# Patient Record
Sex: MALE | Race: BLACK OR AFRICAN AMERICAN | Employment: FULL TIME | URBAN - METROPOLITAN AREA
[De-identification: names, ages, dates, MRNs, and addresses within clinical notes are randomized per-mention and may not be internally consistent; named-entity substitution may affect disease eponyms.]

---

## 2024-04-11 DIAGNOSIS — S61.532D GUNSHOT WOUND OF LEFT WRIST, SUBSEQUENT ENCOUNTER: ICD-10-CM

## 2024-04-11 DIAGNOSIS — Z11.4 SCREENING FOR HIV (HUMAN IMMUNODEFICIENCY VIRUS): ICD-10-CM

## 2024-04-11 DIAGNOSIS — I10 ESSENTIAL HYPERTENSION: ICD-10-CM

## 2024-04-11 DIAGNOSIS — Z13.220 SCREENING FOR LIPID DISORDERS: ICD-10-CM

## 2024-04-11 DIAGNOSIS — Z11.59 NEED FOR HEPATITIS C SCREENING TEST: ICD-10-CM

## 2024-04-11 PROBLEM — S61.532A: Status: ACTIVE | Noted: 2024-04-11

## 2024-04-11 PROBLEM — S61.531A: Status: ACTIVE | Noted: 2024-04-11

## 2024-04-11 PROBLEM — R03.0 ELEVATED BP WITHOUT DIAGNOSIS OF HYPERTENSION: Status: ACTIVE | Noted: 2024-04-11

## 2024-04-12 ENCOUNTER — HOME HEALTH ADMISSION (OUTPATIENT)
Age: 24
End: 2024-04-12
Payer: MEDICAID

## 2024-04-14 ENCOUNTER — HOME CARE VISIT (OUTPATIENT)
Age: 24
End: 2024-04-14
Payer: MEDICAID

## 2024-04-14 PROCEDURE — G0299 HHS/HOSPICE OF RN EA 15 MIN: HCPCS

## 2024-04-15 ENCOUNTER — HOME CARE VISIT (OUTPATIENT)
Age: 24
End: 2024-04-15
Payer: MEDICAID

## 2024-04-15 VITALS
TEMPERATURE: 98.8 F | SYSTOLIC BLOOD PRESSURE: 134 MMHG | RESPIRATION RATE: 20 BRPM | DIASTOLIC BLOOD PRESSURE: 80 MMHG | OXYGEN SATURATION: 98 % | HEART RATE: 92 BPM

## 2024-04-15 VITALS
RESPIRATION RATE: 16 BRPM | SYSTOLIC BLOOD PRESSURE: 140 MMHG | OXYGEN SATURATION: 98 % | HEART RATE: 78 BPM | TEMPERATURE: 98.8 F | DIASTOLIC BLOOD PRESSURE: 80 MMHG

## 2024-04-15 PROCEDURE — G0299 HHS/HOSPICE OF RN EA 15 MIN: HCPCS

## 2024-04-15 ASSESSMENT — ENCOUNTER SYMPTOMS
PAIN LOCATION - PAIN QUALITY: THROBBING
DYSPNEA ACTIVITY LEVEL: AFTER AMBULATING 10 - 20 FT
HEMOPTYSIS: 0
PAIN LOCATION - PAIN QUALITY: SHARP/THROBBING
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
## 2024-04-15 NOTE — HOME HEALTH
Limit Tylenol to 4000 mg /day max.  Eat a diet high in protein to promote healing.     Patient level of understanding of education provided: Patient with good understanding of material presented.   Patient response to procedure performed:  Patient was cooperative and engaged throughout SOC interview.     Home exercise program/Homework provided: Keep dressing Clean dry and intact.  Continue to take all medications as prescribed.  Needs the assistance of one other for safe ambulation. Be observant for s/s of infection.  Take all antibiotics until they are finished./U with PCP and surgeon as instructed.     Functional Mobility:  Bed Mobility (rolling/scooting): Independent  Transfers (supine to chair and return to supine): Supervision or Touching Assistance.  Patient uses device: no  Gait:  Ambulates at the modified independent level using no equipment  Safety concerns with mobility include: pain interferes with activity  DME: no equipment     Pt/Caregiver instructed on plan of care and are agreeable to plan of care at this time.      NP Cat Coon  notified of patient admission to home health and plan of care including anticipated frequency of visits  and treatments/interventions/modalities of SN.    Discharge planning discussed with patient and caregiver.  Discharge planning as follows: Will discharge when education is completed,  patient is medically stable and wound is healed, or family can manage dressing. .  Pt/Caregiver did verbalize understanding of discharge planning.     Next MD appointment TBD with Cat Coon NP.  Patient/caregiver encouraged/instructed to keep appointment as lack of follow through with physician appointment could result in discontinuation of home care services for non-compliance.

## 2024-04-20 ENCOUNTER — HOME CARE VISIT (OUTPATIENT)
Age: 24
End: 2024-04-20
Payer: MEDICAID

## 2024-04-20 VITALS
DIASTOLIC BLOOD PRESSURE: 70 MMHG | RESPIRATION RATE: 16 BRPM | SYSTOLIC BLOOD PRESSURE: 110 MMHG | OXYGEN SATURATION: 98 % | TEMPERATURE: 98 F | HEART RATE: 78 BPM

## 2024-04-20 PROCEDURE — G0300 HHS/HOSPICE OF LPN EA 15 MIN: HCPCS

## 2024-04-20 NOTE — HOME HEALTH
Skilled Needs: Education and Wound Care   Caregiver involvement: Pt independent with care    Medications reviewed and all medications are available in the home this visit.    The following education was provided regarding medications:  TO GARRISON notified of any discrepancies/look a-like medications/medication interactions: TO  Medications are effecrtive at this time.    Home health supplies by type and quantity ordered/delivered this visit include:  Supplies available   Patient education provided this visit:  Reviewed to reported any redness, swelling, warmth, fever, chills, increased heart/respiratory rate, uncontrolled pain/tenderness, drainage:green/yellow/brown, or odor to MD immediately. Wound care completed as ordered. Pt awaiting to hear back from MD about schedulingg surgery   Sharps education provided: TO  Patient level of understanding of education provided: Verbalzied all understanding to above education  Skilled Care Performed this visit: Education and Wound Care  Patient response to procedure performed: Minimal pain during dressing change   Agency Progress toward goals: Progressing toward interventions above  Patient's Progress towards personal goals: when patient reaches goals and medication is managed, and disease processes are understood patient agrees and understand that discharge will take place

## 2024-04-23 ENCOUNTER — HOME CARE VISIT (OUTPATIENT)
Age: 24
End: 2024-04-23
Payer: MEDICAID

## 2024-04-23 VITALS
RESPIRATION RATE: 16 BRPM | OXYGEN SATURATION: 99 % | TEMPERATURE: 97 F | SYSTOLIC BLOOD PRESSURE: 112 MMHG | DIASTOLIC BLOOD PRESSURE: 70 MMHG | HEART RATE: 67 BPM

## 2024-04-23 PROCEDURE — G0300 HHS/HOSPICE OF LPN EA 15 MIN: HCPCS

## 2024-04-23 NOTE — HOME HEALTH
Skilled Needs: Education and Wound Care   Caregiver involvement: Pt independent with care with the exception of wound treatment due to location   Medications reviewed and all medications are available in the home this visit.    The following education was provided regarding medications:  TO GARRISON notified of any discrepancies/look a-like medications/medication interactions: TO  Medications are effecrtive at this time.    Home health supplies by type and quantity ordered/delivered this visit include:  Supplies available   Patient education provided this visit:  Reviewed to reported any redness, swelling, warmth, fever, chills, increased heart/respiratory rate, uncontrolled pain/tenderness, drainage:green/yellow/brown, or odor to MD immediately. Pt due to see surgeon tomorrow and is looking to go back to wor on the 9th of Maay. Nurse educated pt that he is going to have to be discharged once returning to work he verbalzized his mom will help with dressing changes if needed once he goes back to work. Pt also going to call PCP for [employer] to return to work. No pain verbalzied and not taking any pain medications at this time.   Sharps education provided: TO  Patient level of understanding of education provided: Verbalzied all understanding to above education  Skilled Care Performed this visit: Education and Wound Care  Patient response to procedure performed: Minimal pain during dressing change   Agency Progress toward goals: Progressing toward interventions above  Patient's Progress towards personal goals: when patient reaches goals and medication is managed, and disease processes are understood patient agrees and understand that discharge will take place

## 2024-04-26 ENCOUNTER — HOME CARE VISIT (OUTPATIENT)
Age: 24
End: 2024-04-26
Payer: MEDICAID
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
## 2024-04-27 NOTE — HOME HEALTH
spoke with pt last night set time for this morning. texted pt to advise was on my way he verbalzied ok got there reang the bell knocked on the door and called to get no answer to any of the latter. Recieved a message at 1230pm asking If i could come back advised would see next weel. Pt verbzlied he plans on being discharged monday anyway nurs schedule [facility] DC per pt request.

## 2024-04-29 ENCOUNTER — OFFICE VISIT (OUTPATIENT)
Facility: CLINIC | Age: 24
End: 2024-04-29
Payer: MEDICAID

## 2024-04-29 VITALS
SYSTOLIC BLOOD PRESSURE: 140 MMHG | BODY MASS INDEX: 46.98 KG/M2 | WEIGHT: 310 LBS | TEMPERATURE: 97 F | OXYGEN SATURATION: 100 % | HEART RATE: 68 BPM | DIASTOLIC BLOOD PRESSURE: 82 MMHG | HEIGHT: 68 IN | RESPIRATION RATE: 16 BRPM

## 2024-04-29 DIAGNOSIS — Z02.89 ENCOUNTER FOR COMPLETION OF FORM WITH PATIENT: ICD-10-CM

## 2024-04-29 DIAGNOSIS — S61.532D GUNSHOT WOUND OF LEFT WRIST, SUBSEQUENT ENCOUNTER: Primary | ICD-10-CM

## 2024-04-29 PROCEDURE — 99214 OFFICE O/P EST MOD 30 MIN: CPT

## 2024-04-29 PROCEDURE — 3078F DIAST BP <80 MM HG: CPT

## 2024-04-29 PROCEDURE — 3074F SYST BP LT 130 MM HG: CPT

## 2024-04-29 SDOH — ECONOMIC STABILITY: HOUSING INSECURITY
IN THE LAST 12 MONTHS, WAS THERE A TIME WHEN YOU DID NOT HAVE A STEADY PLACE TO SLEEP OR SLEPT IN A SHELTER (INCLUDING NOW)?: NO

## 2024-04-29 SDOH — ECONOMIC STABILITY: FOOD INSECURITY: WITHIN THE PAST 12 MONTHS, YOU WORRIED THAT YOUR FOOD WOULD RUN OUT BEFORE YOU GOT MONEY TO BUY MORE.: NEVER TRUE

## 2024-04-29 SDOH — ECONOMIC STABILITY: FOOD INSECURITY: WITHIN THE PAST 12 MONTHS, THE FOOD YOU BOUGHT JUST DIDN'T LAST AND YOU DIDN'T HAVE MONEY TO GET MORE.: NEVER TRUE

## 2024-04-29 SDOH — ECONOMIC STABILITY: INCOME INSECURITY: HOW HARD IS IT FOR YOU TO PAY FOR THE VERY BASICS LIKE FOOD, HOUSING, MEDICAL CARE, AND HEATING?: NOT HARD AT ALL

## 2024-04-29 ASSESSMENT — PATIENT HEALTH QUESTIONNAIRE - PHQ9
SUM OF ALL RESPONSES TO PHQ QUESTIONS 1-9: 0
1. LITTLE INTEREST OR PLEASURE IN DOING THINGS: NOT AT ALL
SUM OF ALL RESPONSES TO PHQ9 QUESTIONS 1 & 2: 0
2. FEELING DOWN, DEPRESSED OR HOPELESS: NOT AT ALL
SUM OF ALL RESPONSES TO PHQ QUESTIONS 1-9: 0

## 2024-04-29 ASSESSMENT — ENCOUNTER SYMPTOMS: SHORTNESS OF BREATH: 0
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
## 2024-05-01 ENCOUNTER — HOME CARE VISIT (OUTPATIENT)
Age: 24
End: 2024-05-01
Payer: MEDICAID

## 2024-05-15 ENCOUNTER — TELEPHONE (OUTPATIENT)
Facility: CLINIC | Age: 24
End: 2024-05-15

## 2024-05-15 NOTE — TELEPHONE ENCOUNTER
Josephine from [facility] states the pt has been seen three times at there office and his blood pressure has been extremely high. Today blood pressure readings went from 160/120 then down to 179/99.     Please call patient and schedule a f/u for blood pressure.

## 2024-05-15 NOTE — TELEPHONE ENCOUNTER
First, attempt to reach patient to get scheduled no answer left message for patient to give the office a call back.